# Patient Record
Sex: MALE | Race: WHITE | NOT HISPANIC OR LATINO | Employment: FULL TIME | URBAN - METROPOLITAN AREA
[De-identification: names, ages, dates, MRNs, and addresses within clinical notes are randomized per-mention and may not be internally consistent; named-entity substitution may affect disease eponyms.]

---

## 2023-12-09 ENCOUNTER — HOSPITAL ENCOUNTER (EMERGENCY)
Facility: HOSPITAL | Age: 63
Discharge: HOME | End: 2023-12-09
Attending: EMERGENCY MEDICINE

## 2023-12-09 VITALS
HEIGHT: 63 IN | HEART RATE: 67 BPM | SYSTOLIC BLOOD PRESSURE: 146 MMHG | DIASTOLIC BLOOD PRESSURE: 84 MMHG | RESPIRATION RATE: 18 BRPM | OXYGEN SATURATION: 97 % | BODY MASS INDEX: 22.86 KG/M2 | WEIGHT: 129 LBS | TEMPERATURE: 98 F

## 2023-12-09 DIAGNOSIS — H57.8A2 FOREIGN BODY SENSATION, LEFT EYE: ICD-10-CM

## 2023-12-09 DIAGNOSIS — H57.89 IRRITATION OF LEFT EYE: Primary | ICD-10-CM

## 2023-12-09 PROCEDURE — 2500000001 HC RX 250 WO HCPCS SELF ADMINISTERED DRUGS (ALT 637 FOR MEDICARE OP): Performed by: EMERGENCY MEDICINE

## 2023-12-09 PROCEDURE — 99283 EMERGENCY DEPT VISIT LOW MDM: CPT | Performed by: EMERGENCY MEDICINE

## 2023-12-09 RX ORDER — TOBRAMYCIN 3 MG/ML
1 SOLUTION/ DROPS OPHTHALMIC 4 TIMES DAILY
Qty: 5 ML | Refills: 0 | Status: SHIPPED | OUTPATIENT
Start: 2023-12-09 | End: 2023-12-14

## 2023-12-09 RX ORDER — TOBRAMYCIN 3 MG/ML
1 SOLUTION/ DROPS OPHTHALMIC 4 TIMES DAILY
Qty: 5 ML | Refills: 0 | Status: SHIPPED | OUTPATIENT
Start: 2023-12-09 | End: 2023-12-09 | Stop reason: SDUPTHER

## 2023-12-09 RX ORDER — TETRACAINE HYDROCHLORIDE 5 MG/ML
1 SOLUTION OPHTHALMIC ONCE
Status: COMPLETED | OUTPATIENT
Start: 2023-12-09 | End: 2023-12-09

## 2023-12-09 RX ADMIN — FLUORESCEIN SODIUM 1 STRIP: 1 STRIP OPHTHALMIC at 12:00

## 2023-12-09 RX ADMIN — TETRACAINE HYDROCHLORIDE 1 DROP: 5 SOLUTION OPHTHALMIC at 12:00

## 2023-12-09 ASSESSMENT — PAIN DESCRIPTION - DESCRIPTORS: DESCRIPTORS: BURNING

## 2023-12-09 ASSESSMENT — LIFESTYLE VARIABLES
HAVE PEOPLE ANNOYED YOU BY CRITICIZING YOUR DRINKING: NO
EVER HAD A DRINK FIRST THING IN THE MORNING TO STEADY YOUR NERVES TO GET RID OF A HANGOVER: NO
HAVE YOU EVER FELT YOU SHOULD CUT DOWN ON YOUR DRINKING: NO
REASON UNABLE TO ASSESS: NO
EVER FELT BAD OR GUILTY ABOUT YOUR DRINKING: NO

## 2023-12-09 ASSESSMENT — VISUAL ACUITY
OU: 20/20
OS: 20/40
OD: 20/30

## 2023-12-09 ASSESSMENT — PAIN DESCRIPTION - ORIENTATION: ORIENTATION: LEFT

## 2023-12-09 ASSESSMENT — PAIN SCALES - GENERAL: PAINLEVEL_OUTOF10: 8

## 2023-12-09 ASSESSMENT — PAIN - FUNCTIONAL ASSESSMENT: PAIN_FUNCTIONAL_ASSESSMENT: 0-10

## 2023-12-09 ASSESSMENT — PAIN DESCRIPTION - LOCATION: LOCATION: EYE

## 2023-12-09 NOTE — ED PROVIDER NOTES
HPI   Chief Complaint   Patient presents with    fb left eye       HPI: []  63-year-old white male history of PVD status post stents in lower extremities on Plavix today comes in with foreign body sensation in the left eye.  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  He was not wearing safety goggles he was wearing his eyeglasses.  He has no history of cataracts or glaucoma.  Since then he has a foreign body sensation.  No vision loss.  No double vision blurred vision loss of vision.  Sensation is in the upper nasal aspect of the eye.    Past history: Hypertension, PVD, tobacco use  Social: Patient is a smoker denies alcohol denies drug abuse.  REVIEW OF SYSTEMS:    GENERAL.: No weight loss, fatigue, anorexia, insomnia, fever.    EYES: No vision loss, double vision, drainage, eye pain.  Positive for foreign body sensation in the eye    ENT: No pharyngitis, dry mouth.    CARDIOPULMONARY: No chest pain, palpitations, syncope, near syncope. No shortness of breath, cough, hemoptysis.    GI: No abdominal pain, change in bowel habits, melena, hematemesis, hematochezia, nausea, vomiting, diarrhea.    : No discharge, dysuria, frequency, urgency, hematuria.    MS: No limb pain, joint pain, joint swelling.    SKIN: No rashes.    PSYCH: No depression, anxiety, suicidality, homicidality.    Review of systems is otherwise negative unless stated above or in history of present illness.  Social history, family history, allergies reviewed.  PHYSICAL EXAM:    GENERAL: Vitals noted, no distress. Alert and oriented  x 3. Non-toxic.      EENT: TMs clear. Posterior oropharynx unremarkable. No meningismus. No LAD.   Eyes: Pupils equal round and reactive light accommodation EOMs are intact left upper eyelid some mild erythema and redness no obvious stye no chalazion.  Left eye globe is intact cornea intact no foreign body identified.  There is no corneal  abrasion or ulceration anterior chamber intact.  He has no obvious foreign body in the upper eyelid.  I did john the eyelid, he has some mild injection of the sclera.  His fluorescein uptake test was negative.  NECK: Supple. Nontender. No midline tenderness.     CARDIAC: Regular, rate, rhythm. No murmurs rubs or gallops. No JVD    PULMONARY: Audible scattered rhonchi no wheezing,  no tachypnea stridor or retractions.  No respiratory distress.     ABDOMEN: Soft, nonsurgical. Nontender. No peritoneal signs. Normoactive bowel sounds. No pulsatile masses.     EXTREMITIES: No peripheral edema. Negative Homans bilaterally, no cords.    SKIN: No rash. Intact.     NEURO: No focal neurologic deficits, NIH score of 0. Cranial nerves normal as tested from II through XII.     MEDICAL DECISION MAKING:  Impression: #1 left eye foreign body sensation    Plan/MDM: 63-year-old white male today comes in with a 4 emergency left eye after he thought something flew in his eye while he was working with plastics at his job on my exam I do not appreciate any obvious foreign body there is no obvious corneal abrasion or corneal ulceration globe is intact vision normal, this happened about 4 days ago.  He has been irrigating his eye.  Patient be discharged home with Tobrex eyedrops close outpatient follow-up with primary doctor and ophthalmology with strict return precaution.  He also was counseled regarding smoking cessation.                          Mary Coma Scale Score: 15                  Patient History   No past medical history on file.  No past surgical history on file.  No family history on file.  Social History     Tobacco Use    Smoking status: Not on file    Smokeless tobacco: Not on file   Substance Use Topics    Alcohol use: Not on file    Drug use: Not on file       Physical Exam   ED Triage Vitals [12/09/23 1146]   Temp Heart Rate Resp BP   36.7 °C (98 °F) 67 18 146/84      SpO2 Temp Source Heart Rate Source Patient  Position   97 % Temporal Monitor Sitting      BP Location FiO2 (%)     Left arm --       Physical Exam    ED Course & [facility]   ED Course as of 12/09/23 1628   Sat Dec 09, 2023   1421 Patient left I was anesthetized with tetracaine, on visual inspection he has no foreign body I did john the eyelid no foreign identified.  There is no fluorescein uptake no abrasion or ulceration identified.  Visual acuity is normal, interrogated intact, EOMs intact, no foreign body endophyte medial canthi mild injection identified over the sclera, patient be discharged home with a Tobrex eyedrops, advised and outpatient follow ophthalmology with strict return precautions. [MT]      ED Course User Index  [MT] Alida Walker MD         Diagnoses as of 12/09/23 1628   Foreign body sensation, left eye   Irritation of left eye       Medical Decision Making      Procedure  Procedures     Alida Walker MD  12/09/23 1631

## 2024-08-19 NOTE — DISCHARGE INSTRUCTIONS
Please follow up with your opth   Detail Level: Simple Patient Specific Otc Recommendations (Will Not Stick From Patient To Patient): Vaseline and donut pillow

## 2024-09-10 ENCOUNTER — PHARMACY VISIT (OUTPATIENT)
Dept: PHARMACY | Facility: CLINIC | Age: 64
End: 2024-09-10
Payer: COMMERCIAL

## 2024-09-10 ENCOUNTER — HOSPITAL ENCOUNTER (EMERGENCY)
Facility: HOSPITAL | Age: 64
Discharge: HOME | End: 2024-09-10
Payer: OTHER GOVERNMENT

## 2024-09-10 VITALS
RESPIRATION RATE: 14 BRPM | SYSTOLIC BLOOD PRESSURE: 159 MMHG | BODY MASS INDEX: 22.15 KG/M2 | OXYGEN SATURATION: 98 % | DIASTOLIC BLOOD PRESSURE: 73 MMHG | TEMPERATURE: 99.5 F | HEIGHT: 63 IN | HEART RATE: 78 BPM | WEIGHT: 125 LBS

## 2024-09-10 DIAGNOSIS — S39.012A LUMBAR STRAIN, INITIAL ENCOUNTER: Primary | ICD-10-CM

## 2024-09-10 PROCEDURE — 96372 THER/PROPH/DIAG INJ SC/IM: CPT | Performed by: NURSE PRACTITIONER

## 2024-09-10 PROCEDURE — 2500000004 HC RX 250 GENERAL PHARMACY W/ HCPCS (ALT 636 FOR OP/ED): Performed by: NURSE PRACTITIONER

## 2024-09-10 PROCEDURE — 99283 EMERGENCY DEPT VISIT LOW MDM: CPT | Performed by: NURSE PRACTITIONER

## 2024-09-10 PROCEDURE — RXMED WILLOW AMBULATORY MEDICATION CHARGE

## 2024-09-10 RX ORDER — KETOROLAC TROMETHAMINE 10 MG/1
10 TABLET, FILM COATED ORAL 4 TIMES DAILY
Qty: 20 TABLET | Refills: 0 | Status: SHIPPED | OUTPATIENT
Start: 2024-09-10 | End: 2024-09-15

## 2024-09-10 RX ORDER — KETOROLAC TROMETHAMINE 30 MG/ML
60 INJECTION, SOLUTION INTRAMUSCULAR; INTRAVENOUS ONCE
Status: COMPLETED | OUTPATIENT
Start: 2024-09-10 | End: 2024-09-10

## 2024-09-10 RX ORDER — CYCLOBENZAPRINE HCL 10 MG
10 TABLET ORAL 3 TIMES DAILY PRN
Qty: 17 TABLET | Refills: 0 | Status: SHIPPED | OUTPATIENT
Start: 2024-09-10

## 2024-09-10 ASSESSMENT — PAIN DESCRIPTION - PAIN TYPE: TYPE: ACUTE PAIN

## 2024-09-10 ASSESSMENT — LIFESTYLE VARIABLES
TOTAL SCORE: 0
EVER FELT BAD OR GUILTY ABOUT YOUR DRINKING: NO
HAVE PEOPLE ANNOYED YOU BY CRITICIZING YOUR DRINKING: NO
EVER HAD A DRINK FIRST THING IN THE MORNING TO STEADY YOUR NERVES TO GET RID OF A HANGOVER: NO
HAVE YOU EVER FELT YOU SHOULD CUT DOWN ON YOUR DRINKING: NO

## 2024-09-10 ASSESSMENT — PAIN DESCRIPTION - DESCRIPTORS: DESCRIPTORS: SHARP;SHOOTING

## 2024-09-10 ASSESSMENT — PAIN DESCRIPTION - PROGRESSION: CLINICAL_PROGRESSION: GRADUALLY WORSENING

## 2024-09-10 ASSESSMENT — PAIN DESCRIPTION - ONSET: ONSET: ONGOING

## 2024-09-10 ASSESSMENT — COLUMBIA-SUICIDE SEVERITY RATING SCALE - C-SSRS
6. HAVE YOU EVER DONE ANYTHING, STARTED TO DO ANYTHING, OR PREPARED TO DO ANYTHING TO END YOUR LIFE?: NO
2. HAVE YOU ACTUALLY HAD ANY THOUGHTS OF KILLING YOURSELF?: NO
1. IN THE PAST MONTH, HAVE YOU WISHED YOU WERE DEAD OR WISHED YOU COULD GO TO SLEEP AND NOT WAKE UP?: NO

## 2024-09-10 ASSESSMENT — PAIN DESCRIPTION - ORIENTATION: ORIENTATION: LEFT;LOWER

## 2024-09-10 ASSESSMENT — PAIN SCALES - GENERAL: PAINLEVEL_OUTOF10: 10 - WORST POSSIBLE PAIN

## 2024-09-10 ASSESSMENT — PAIN DESCRIPTION - LOCATION: LOCATION: BACK

## 2024-09-10 ASSESSMENT — PAIN DESCRIPTION - FREQUENCY: FREQUENCY: CONSTANT/CONTINUOUS

## 2024-09-10 ASSESSMENT — PAIN - FUNCTIONAL ASSESSMENT: PAIN_FUNCTIONAL_ASSESSMENT: 0-10

## 2024-09-10 NOTE — ED PROVIDER NOTES
Chief Complaint   Patient presents with   • Back Pain       HPI       64 year old male presents to the Emergency Department today complaining of a 3 day history of lower back pain that he describes as sharp in nature, constant, non-radiating, and varies in intensity. Notes that the pain started after he bend over to  his loofah in the shower. Certain movements increase the pain. Denies any associated fever, chills, headache, neck pain, chest pain, shortness of breath, abdominal pain, nausea, vomiting, diarrhea, constipation, hematemesis, hematochezia, melena, urinary symptoms, paresthesias, focal weakness of extremities, or problems with bowel or bladder function.       History provided by:  Patient             Patient History   No past medical history on file.  No past surgical history on file.  No family history on file.  Social History     Tobacco Use   • Smoking status: Not on file   • Smokeless tobacco: Not on file   Substance Use Topics   • Alcohol use: Not on file   • Drug use: Not on file           Physical Exam  Constitutional:       Appearance: Normal appearance.   HENT:      Head: Normocephalic.      Right Ear: External ear normal.      Left Ear: External ear normal.      Nose: Nose normal. No septal deviation.      Right Turbinates: Not enlarged.      Left Turbinates: Not enlarged.      Mouth/Throat:      Lips: Pink.      Mouth: Mucous membranes are moist.      Dentition: No dental caries.      Tongue: No lesions.      Pharynx: Oropharynx is clear. Uvula midline.      Tonsils: No tonsillar exudate. 1+ on the right. 1+ on the left.   Eyes:      General: Lids are normal.      Conjunctiva/sclera: Conjunctivae normal.   Cardiovascular:      Rate and Rhythm: Normal rate and regular rhythm.      Pulses:           Radial pulses are 3+ on the right side and 3+ on the left side.      Heart sounds: Normal heart sounds. No murmur heard.     No friction rub. No gallop.   Pulmonary:      Effort: Pulmonary  effort is normal.      Breath sounds: Normal breath sounds. No wheezing, rhonchi or rales.   Abdominal:      General: Abdomen is flat. Bowel sounds are normal.      Palpations: Abdomen is soft.      Tenderness: There is no abdominal tenderness. There is no right CVA tenderness or left CVA tenderness. Negative signs include Main's sign and McBurney's sign.   Musculoskeletal:      Cervical back: Full passive range of motion without pain and neck supple.      Comments: No edema, cyanosis, or clubbing noted. No spinous process tenderness, but does have bilateral paraspinal muscle tenderness to the lumbar sacral region. No saddle paresthesias. Negative straight leg raises. Able to plantarflex and dorsiflex bilateral great toes without difficulty.   Lymphadenopathy:      Cervical: No cervical adenopathy.   Skin:     General: Skin is warm.      Capillary Refill: Capillary refill takes less than 2 seconds.      Findings: No petechiae or rash. Rash is not purpuric.   Neurological:      Mental Status: He is alert and oriented to person, place, and time.      Sensory: Sensation is intact.      Motor: Motor function is intact.      Coordination: Coordination is intact.      Gait: Gait is intact.   Psychiatric:         Attention and Perception: Attention normal.         Mood and Affect: Mood normal.         Speech: Speech normal.         Behavior: Behavior normal. Behavior is cooperative.         Labs Reviewed - No data to display    No orders to display            ED Course & MDM   Diagnoses as of 09/10/24 1030   Lumbar strain, initial encounter           Medical Decision Making  Patient was seen and evaluated by myself. Exhibits no signs of cauda equina or spinal epidural abscess. Instructed to apply ice or heat as needed. Given an injection of Toradol with improvement in the pain. Given prescriptions for Toradol and Flexeril. Continue to use his Lidoderm patches as previously directed. No contraindications to NSAIDs are  noted. Follow up with their doctor in 1 week. Return if worse in any way. Discharged in stable condition with computer instructions.    Diagnostic Impression:     1. Acute lumbar muscle strain    2. IM injection    3. Prescription therapy            Your medication list      You have not been prescribed any medications.           Procedure  Procedures     Nicholas Shaw, DAVIDA-CNP  09/10/24 8159

## 2024-09-10 NOTE — PROGRESS NOTES
Medication Education     Medication education for Matti Kahn was provided to the patient  for the following medication(s):  Cyclobenzaprine 10mg  Ketorolac 10mg      Medication education provided by a Pharmacist:  ADR Counseling Medication interactions Dose, frequency, storage Any drug interactions (including OTCs and herbvals) and importance of notifying a healthcare provider of any medication changes    Identified potential barriers to education:  None    Method(s) of Education:  Verbal    An opportunity to ask questions and receive answers was provided.     Assessment of understanding the patient :  2= meets goals/outcomes    Additional Notes (if applicable):     Jose Mullins

## 2024-11-17 ENCOUNTER — HOSPITAL ENCOUNTER (EMERGENCY)
Facility: HOSPITAL | Age: 64
Discharge: HOME | End: 2024-11-17
Payer: OTHER GOVERNMENT

## 2024-11-17 ENCOUNTER — PHARMACY VISIT (OUTPATIENT)
Dept: PHARMACY | Facility: CLINIC | Age: 64
End: 2024-11-17
Payer: COMMERCIAL

## 2024-11-17 VITALS
BODY MASS INDEX: 22.15 KG/M2 | WEIGHT: 125 LBS | DIASTOLIC BLOOD PRESSURE: 84 MMHG | RESPIRATION RATE: 15 BRPM | SYSTOLIC BLOOD PRESSURE: 156 MMHG | HEART RATE: 71 BPM | HEIGHT: 63 IN | OXYGEN SATURATION: 97 % | TEMPERATURE: 97.7 F

## 2024-11-17 DIAGNOSIS — I88.9 LYMPHADENITIS: Primary | ICD-10-CM

## 2024-11-17 PROCEDURE — 99283 EMERGENCY DEPT VISIT LOW MDM: CPT

## 2024-11-17 PROCEDURE — RXMED WILLOW AMBULATORY MEDICATION CHARGE

## 2024-11-17 RX ORDER — NAPROXEN 500 MG/1
500 TABLET ORAL
Qty: 17 TABLET | Refills: 0 | Status: SHIPPED | OUTPATIENT
Start: 2024-11-17

## 2024-11-17 RX ORDER — AMOXICILLIN AND CLAVULANATE POTASSIUM 875; 125 MG/1; MG/1
875 TABLET, FILM COATED ORAL 2 TIMES DAILY
Qty: 20 TABLET | Refills: 0 | Status: SHIPPED | OUTPATIENT
Start: 2024-11-17 | End: 2024-11-27

## 2024-11-17 ASSESSMENT — LIFESTYLE VARIABLES
EVER HAD A DRINK FIRST THING IN THE MORNING TO STEADY YOUR NERVES TO GET RID OF A HANGOVER: NO
HAVE PEOPLE ANNOYED YOU BY CRITICIZING YOUR DRINKING: NO
HAVE YOU EVER FELT YOU SHOULD CUT DOWN ON YOUR DRINKING: NO
TOTAL SCORE: 0
EVER FELT BAD OR GUILTY ABOUT YOUR DRINKING: NO

## 2024-11-17 ASSESSMENT — PAIN SCALES - GENERAL: PAINLEVEL_OUTOF10: 8

## 2024-11-17 ASSESSMENT — PAIN DESCRIPTION - PAIN TYPE: TYPE: ACUTE PAIN

## 2024-11-17 ASSESSMENT — COLUMBIA-SUICIDE SEVERITY RATING SCALE - C-SSRS
2. HAVE YOU ACTUALLY HAD ANY THOUGHTS OF KILLING YOURSELF?: NO
1. IN THE PAST MONTH, HAVE YOU WISHED YOU WERE DEAD OR WISHED YOU COULD GO TO SLEEP AND NOT WAKE UP?: NO
6. HAVE YOU EVER DONE ANYTHING, STARTED TO DO ANYTHING, OR PREPARED TO DO ANYTHING TO END YOUR LIFE?: NO

## 2024-11-17 ASSESSMENT — PAIN - FUNCTIONAL ASSESSMENT: PAIN_FUNCTIONAL_ASSESSMENT: 0-10

## 2024-11-17 ASSESSMENT — PAIN DESCRIPTION - ORIENTATION: ORIENTATION: LEFT

## 2024-11-17 ASSESSMENT — PAIN DESCRIPTION - LOCATION: LOCATION: FACE

## 2024-11-17 ASSESSMENT — PAIN DESCRIPTION - DESCRIPTORS: DESCRIPTORS: ACHING

## 2024-11-17 NOTE — ED PROVIDER NOTES
Chief Complaint   Patient presents with    Swollen Glands       HPI       64 year old male presents to the Emergency Department today complaining of a 2-3 day history of a swollen gland in the left neck. Denies any associated fever, chills, headache, dysphagia, neck pain, chest pain, shortness of breath, abdominal pain, nausea, vomiting, diarrhea, constipation, or urinary symptoms.       History provided by:  Patient             Patient History   No past medical history on file.  No past surgical history on file.  No family history on file.  Social History     Tobacco Use    Smoking status: Not on file    Smokeless tobacco: Not on file   Substance Use Topics    Alcohol use: Not on file    Drug use: Not on file           Physical Exam  Constitutional:       Appearance: Normal appearance.   HENT:      Head: Normocephalic.      Right Ear: Tympanic membrane, ear canal and external ear normal.      Left Ear: Tympanic membrane, ear canal and external ear normal.      Nose: Nose normal.      Mouth/Throat:      Lips: Pink.      Mouth: Mucous membranes are moist.      Dentition: No dental caries.      Pharynx: Oropharynx is clear. Uvula midline. No oropharyngeal exudate or posterior oropharyngeal erythema.      Tonsils: No tonsillar exudate. 0 on the right. 0 on the left.      Comments: No trismus.  Eyes:      Conjunctiva/sclera: Conjunctivae normal.      Pupils: Pupils are equal, round, and reactive to light.   Cardiovascular:      Rate and Rhythm: Normal rate and regular rhythm.      Heart sounds: No murmur heard.     No friction rub. No gallop.   Pulmonary:      Effort: Pulmonary effort is normal. No respiratory distress.      Breath sounds: Normal breath sounds. No stridor. No wheezing, rhonchi or rales.   Musculoskeletal:      Cervical back: Full passive range of motion without pain and normal range of motion.   Lymphadenopathy:      Cervical: Cervical adenopathy present.      Left cervical: Superficial cervical  adenopathy present.   Neurological:      Mental Status: He is alert.         Labs Reviewed - No data to display    No orders to display            ED Course & MDM   Diagnoses as of 11/17/24 1434   Lymphadenitis           Medical Decision Making  Patient was seen and evaluated by myself. He is noted to have left cervical lymphadenitis. No dysphagia, trismus, or signs of airway compromise. To apply warm packs to the area.  Take Tylenol over the counter as needed for pain. Given prescriptions for Naprosyn and Augmentin. No contraindications to NSAIDs are noted. Stressed the importance of follow up for resolution. Referred to ENT for such. Given strict return precautions. Discharged in stable condition with computer instructions.     Diagnostic Impression:    1. Acute lymphadenitis    2. Prescription therapy               Your medication list        ASK your doctor about these medications        Instructions Last Dose Given Next Dose Due   cyclobenzaprine 10 mg tablet  Commonly known as: Flexeril      Take 1 tablet (10 mg) by mouth 3 times a day as needed for muscle spasms.                  Procedure  Procedures     Nicholas Shaw, DAVIDA-CNP  11/17/24 0264

## 2024-11-17 NOTE — ED TRIAGE NOTES
Pt. Arrived to the ED via private car for c/o left facial gland swelling. Pt. States that last night he noticed his gland ws swollen and fees it has got larger throughout th day today

## 2025-06-17 ENCOUNTER — PHARMACY VISIT (OUTPATIENT)
Dept: PHARMACY | Facility: CLINIC | Age: 65
End: 2025-06-17
Payer: COMMERCIAL

## 2025-06-17 ENCOUNTER — HOSPITAL ENCOUNTER (EMERGENCY)
Facility: HOSPITAL | Age: 65
Discharge: HOME | End: 2025-06-17
Payer: OTHER GOVERNMENT

## 2025-06-17 ENCOUNTER — APPOINTMENT (OUTPATIENT)
Dept: RADIOLOGY | Facility: HOSPITAL | Age: 65
End: 2025-06-17
Payer: OTHER GOVERNMENT

## 2025-06-17 VITALS
OXYGEN SATURATION: 98 % | HEART RATE: 72 BPM | DIASTOLIC BLOOD PRESSURE: 87 MMHG | BODY MASS INDEX: 22.15 KG/M2 | RESPIRATION RATE: 18 BRPM | HEIGHT: 63 IN | TEMPERATURE: 97.5 F | WEIGHT: 125 LBS | SYSTOLIC BLOOD PRESSURE: 138 MMHG

## 2025-06-17 DIAGNOSIS — G89.29 CHRONIC RIGHT SHOULDER PAIN: Primary | ICD-10-CM

## 2025-06-17 DIAGNOSIS — M25.511 CHRONIC RIGHT SHOULDER PAIN: Primary | ICD-10-CM

## 2025-06-17 PROCEDURE — 73030 X-RAY EXAM OF SHOULDER: CPT | Mod: RT

## 2025-06-17 PROCEDURE — RXMED WILLOW AMBULATORY MEDICATION CHARGE

## 2025-06-17 PROCEDURE — 73030 X-RAY EXAM OF SHOULDER: CPT | Mod: RIGHT SIDE | Performed by: RADIOLOGY

## 2025-06-17 PROCEDURE — 99283 EMERGENCY DEPT VISIT LOW MDM: CPT

## 2025-06-17 RX ORDER — NAPROXEN 500 MG/1
500 TABLET ORAL
Qty: 14 TABLET | Refills: 0 | Status: SHIPPED | OUTPATIENT
Start: 2025-06-17 | End: 2025-06-24

## 2025-06-17 RX ORDER — METHOCARBAMOL 500 MG/1
500 TABLET, FILM COATED ORAL 2 TIMES DAILY
Qty: 20 TABLET | Refills: 0 | Status: SHIPPED | OUTPATIENT
Start: 2025-06-17 | End: 2025-06-27

## 2025-06-17 ASSESSMENT — PAIN DESCRIPTION - ORIENTATION: ORIENTATION: RIGHT

## 2025-06-17 ASSESSMENT — LIFESTYLE VARIABLES
EVER HAD A DRINK FIRST THING IN THE MORNING TO STEADY YOUR NERVES TO GET RID OF A HANGOVER: NO
TOTAL SCORE: 0
HAVE YOU EVER FELT YOU SHOULD CUT DOWN ON YOUR DRINKING: NO
HAVE PEOPLE ANNOYED YOU BY CRITICIZING YOUR DRINKING: NO
EVER FELT BAD OR GUILTY ABOUT YOUR DRINKING: NO

## 2025-06-17 ASSESSMENT — PAIN SCALES - GENERAL: PAINLEVEL_OUTOF10: 10 - WORST POSSIBLE PAIN

## 2025-06-17 ASSESSMENT — PAIN DESCRIPTION - PAIN TYPE: TYPE: ACUTE PAIN

## 2025-06-17 ASSESSMENT — PAIN - FUNCTIONAL ASSESSMENT: PAIN_FUNCTIONAL_ASSESSMENT: 0-10

## 2025-06-17 ASSESSMENT — PAIN DESCRIPTION - LOCATION: LOCATION: HAND

## 2025-06-17 NOTE — ED PROVIDER NOTES
"    HPI   Chief Complaint   Patient presents with    Hand Pain     Had right hand surgery done in March and has had pain since then. Now having pain radiating into shoulder. Was told by physical therapy to come here for MRI.        This is a 64-year-old  male, that [facility], that is presenting to the emergency room with complaints of right shoulder pain.  The patient [facility] the [facility] in [facility].  He states that they botched the surgery and he has been having more complications ever since.  He has been having pain in his right shoulder and axilla ever since.  Denies any traumatic injury.  He consulted with his primary care physician who thought that he might have a rotator cuff injury.  The patient has an MRI scheduled for July.  Patient states that he is very limited with his range of motion.  He is only able to lift at shoulder height.  Denies any paresthesias.  The patient reports that his orthopedic specialist has not been consulted regarding this pain and they will not provide him any pain medication.                            Mary Coma Scale Score: 15                  Patient History   Medical History[1]  Surgical History[2]  Family History[3]  Social History[4]    Physical Exam   Visit Vitals  /87 (BP Location: Left arm, Patient Position: Sitting)   Pulse 72   Temp 36.4 °C (97.5 °F) (Temporal)   Resp 18   Ht 1.6 m (5' 3\")   Wt 56.7 kg (125 lb)   SpO2 98%   BMI 22.14 kg/m²   BSA 1.59 m²      Physical Exam  Vitals and nursing note reviewed.   HENT:      Head: Normocephalic.   Cardiovascular:      Rate and Rhythm: Normal rate and regular rhythm.      Pulses: Normal pulses.      Heart sounds: Normal heart sounds.   Pulmonary:      Effort: Pulmonary effort is normal.      Breath sounds: Normal breath sounds.   Musculoskeletal:      Comments: No midline spinal tenderness.  There is no obvious deformity noted to the right shoulder.  No erythema or swelling " appreciated.  Patient has pain with palpation of the right axilla.  Abduction to 90 degrees without significant pain.  Hand grasps are strong and equal bilaterally.  Distal extremity with brisk cap refill and sensation intact.   Skin:     General: Skin is warm.      Capillary Refill: Capillary refill takes less than 2 seconds.   Neurological:      General: No focal deficit present.      Mental Status: He is alert.         XR shoulder right 2+ views   Final Result   1. Mild acromioclavicular joint osteoarthrosis. Otherwise,   unremarkable right shoulder radiographs.        MACRO:        None.        Signed by: Sunni Fink 6/17/2025 11:41 AM   Dictation workstation:   [id number]          Labs Reviewed - No data to display      ED Course & MDM   Diagnoses as of 06/17/25 1159   Chronic right shoulder pain           Medical Decision Making  Patient was seen and evaluated by the nurse practitioner, Santa Villanueva.  The patient is presenting to the emergency room with complaints of chronic right shoulder pain.  The patient does not have any obvious swelling or erythema appreciated.  The patient is able to lift to the 90 degree angle.  We have a very low suspicion for septic joint however rotator cuff injury is high on the differential.  An x-ray of the shoulder was performed and showed osteoarthritis of the acromioclavicular joint with no other acute fracture or dislocation.  The patient was notified of the imaging and laboratory results.  He was referred to orthopedics for further evaluation and treatment.  The patient was provided prescription for naproxen and Robaxin for home administration.  He was educated on RICE therapy and is to refrain from any activities all exacerbate his pain.  The patient was discharged in stable condition with computer discharge instructions given.           Your medication list        ASK your doctor about these medications        Instructions Last Dose Given Next Dose Due    cyclobenzaprine 10 mg tablet  Commonly known as: Flexeril      Take 1 tablet (10 mg) by mouth 3 times a day as needed for muscle spasms.       naproxen 500 mg tablet  Commonly known as: Naprosyn      Take 1 tablet (500 mg) by mouth 2 times daily (morning and late afternoon).                Procedure       *This report was transcribed using voice recognition software.  Every effort was made to ensure accuracy; however, inadvertent computerized transcription errors may be present.*  Santa HIGUERA-BLAKE  06/17/25           [1] History reviewed. No pertinent past medical history.  [2] History reviewed. No pertinent surgical history.  [3] No family history on file.  [4]   Social History  Tobacco Use    Smoking status: Not on file    Smokeless tobacco: Not on file   Substance Use Topics    Alcohol use: Not on file    Drug use: Not on file        DAVIDA Avalos-CNP  06/17/25 1200